# Patient Record
Sex: FEMALE | Race: WHITE | NOT HISPANIC OR LATINO
[De-identification: names, ages, dates, MRNs, and addresses within clinical notes are randomized per-mention and may not be internally consistent; named-entity substitution may affect disease eponyms.]

---

## 2017-03-06 ENCOUNTER — APPOINTMENT (OUTPATIENT)
Dept: GYNECOLOGIC ONCOLOGY | Facility: CLINIC | Age: 57
End: 2017-03-06

## 2017-03-06 VITALS
BODY MASS INDEX: 25.19 KG/M2 | WEIGHT: 174 LBS | DIASTOLIC BLOOD PRESSURE: 79 MMHG | HEART RATE: 83 BPM | HEIGHT: 69.5 IN | SYSTOLIC BLOOD PRESSURE: 137 MMHG

## 2017-09-11 ENCOUNTER — APPOINTMENT (OUTPATIENT)
Dept: GYNECOLOGIC ONCOLOGY | Facility: CLINIC | Age: 57
End: 2017-09-11
Payer: COMMERCIAL

## 2017-09-11 VITALS
HEART RATE: 85 BPM | SYSTOLIC BLOOD PRESSURE: 129 MMHG | BODY MASS INDEX: 25.05 KG/M2 | WEIGHT: 173 LBS | DIASTOLIC BLOOD PRESSURE: 81 MMHG | HEIGHT: 69.5 IN

## 2017-09-11 PROCEDURE — 99213 OFFICE O/P EST LOW 20 MIN: CPT

## 2017-10-04 ENCOUNTER — APPOINTMENT (OUTPATIENT)
Dept: SURGERY | Facility: CLINIC | Age: 57
End: 2017-10-04

## 2018-03-15 ENCOUNTER — APPOINTMENT (OUTPATIENT)
Dept: GYNECOLOGIC ONCOLOGY | Facility: CLINIC | Age: 58
End: 2018-03-15
Payer: MEDICAID

## 2018-03-15 VITALS
SYSTOLIC BLOOD PRESSURE: 142 MMHG | WEIGHT: 170 LBS | HEIGHT: 69.5 IN | HEART RATE: 87 BPM | BODY MASS INDEX: 24.61 KG/M2 | DIASTOLIC BLOOD PRESSURE: 82 MMHG

## 2018-03-15 PROCEDURE — 99214 OFFICE O/P EST MOD 30 MIN: CPT

## 2018-09-20 ENCOUNTER — APPOINTMENT (OUTPATIENT)
Dept: GYNECOLOGIC ONCOLOGY | Facility: CLINIC | Age: 58
End: 2018-09-20
Payer: MEDICAID

## 2018-09-20 VITALS
BODY MASS INDEX: 24.69 KG/M2 | DIASTOLIC BLOOD PRESSURE: 82 MMHG | HEIGHT: 70 IN | SYSTOLIC BLOOD PRESSURE: 125 MMHG | HEART RATE: 91 BPM | WEIGHT: 172.5 LBS

## 2018-09-20 DIAGNOSIS — R10.2 PELVIC AND PERINEAL PAIN: ICD-10-CM

## 2018-09-20 PROCEDURE — 99214 OFFICE O/P EST MOD 30 MIN: CPT

## 2018-11-19 ENCOUNTER — APPOINTMENT (OUTPATIENT)
Dept: UROLOGY | Facility: CLINIC | Age: 58
End: 2018-11-19

## 2019-01-18 ENCOUNTER — APPOINTMENT (OUTPATIENT)
Dept: UROGYNECOLOGY | Facility: CLINIC | Age: 59
End: 2019-01-18

## 2019-02-18 ENCOUNTER — TRANSCRIPTION ENCOUNTER (OUTPATIENT)
Age: 59
End: 2019-02-18

## 2019-03-11 ENCOUNTER — APPOINTMENT (OUTPATIENT)
Dept: UROLOGY | Facility: CLINIC | Age: 59
End: 2019-03-11

## 2019-03-29 ENCOUNTER — APPOINTMENT (OUTPATIENT)
Dept: UROGYNECOLOGY | Facility: CLINIC | Age: 59
End: 2019-03-29
Payer: MEDICAID

## 2019-03-29 VITALS
HEIGHT: 67.5 IN | OXYGEN SATURATION: 99 % | HEART RATE: 102 BPM | WEIGHT: 170 LBS | RESPIRATION RATE: 16 BRPM | SYSTOLIC BLOOD PRESSURE: 142 MMHG | BODY MASS INDEX: 26.37 KG/M2 | DIASTOLIC BLOOD PRESSURE: 85 MMHG | TEMPERATURE: 97.6 F

## 2019-03-29 DIAGNOSIS — N32.81 OVERACTIVE BLADDER: ICD-10-CM

## 2019-03-29 DIAGNOSIS — R39.15 URGENCY OF URINATION: ICD-10-CM

## 2019-03-29 DIAGNOSIS — R30.0 DYSURIA: ICD-10-CM

## 2019-03-29 DIAGNOSIS — R35.0 FREQUENCY OF MICTURITION: ICD-10-CM

## 2019-03-29 PROCEDURE — 51701 INSERT BLADDER CATHETER: CPT

## 2019-03-29 PROCEDURE — 99204 OFFICE O/P NEW MOD 45 MIN: CPT | Mod: 25

## 2019-03-29 NOTE — PROCEDURE
[Fluid Management] : fluid management [Bladder Training] : bladder training [FreeTextEntry1] : Due to urinary complaints, a straight catheterization was performed to assess for retention and infection. The urethral meatus was prepped with betadine. Catheter inserted. PVR 20cc obtained. Pt tolerated well.

## 2019-03-29 NOTE — PHYSICAL EXAM
[No Acute Distress] : in no acute distress [Well developed] : well developed [Good Hygeine] : demonstrates good hygeine [Oriented x3] : oriented to person, place, and time [Normal Mood/Affect] : mood and affect are normal [Warm and Dry] : was warm and dry to touch [Normal Gait] : gait was normal [Labia Majora] : were normal [Labia Minora] : were normal [Normal Appearance] : general appearance was normal [Atrophy] : atrophy [No Bleeding] : there was no active vaginal bleeding [2] : 2 [Uterine Adnexae] : were not tender and not enlarged [Exam Deferred] : was deferred [Anxiety] : patient is not anxious [Tenderness] : ~T no ~M abdominal tenderness observed [Distended] : not distended [H/Smegaly] : no hepatosplenomegaly [Inguinal LAD] : no adenopathy was noted in the inguinal lymph nodes [Normal] : was normal

## 2019-03-29 NOTE — HISTORY OF PRESENT ILLNESS
[Cystocele (Obstetric)] : none [Rectal Prolapse] : none [Feelings Of Urinary Urgency] : daily [Urinary Tract Infection] : daily [Constipation Obstructed Defecation] : none [Stool Visible Blood] : none [Incomplete Emptying Of Stool] : none [Pelvic Pain] : none [Unable To Restrain Bowel Movement] : none [x3+] : three or more  times a night [Urinary Frequency] : frequent [] : years ago [de-identified] : 10x, had 3 urine cultures in past year which were negative [de-identified] : 2-3x [FreeTextEntry1] : \par \par \par PMH: constipation, hiatal hernia, uterine cancer\par PSH: robotic total hysterectomy, BSO 6/2013 for 1A grade 3 endometrial cancer s/p cuff radiation\par Meds: Protonix\par \par Intake: 64 oz water, 12 oz seltzer, coffee 2-3 times a week, gingerale\par

## 2019-03-29 NOTE — DISCUSSION/SUMMARY
[FreeTextEntry1] : \par -Urine sent for micro UA and culture, if microhematuria will proceed with the workup. Patient with h/o endometria ca s/p brachytherapy\par -We discussed possible etiologies of her urinary symptoms including overactive bladder. I recommend she start behavioral modifications and bladder training. Written instructions on how to perform bladder training were provided.  She will try this for 4-6 weeks. We reviewed medications for overactive bladder.  If she has no significant improvement in her symptoms she will try adding an anticholinergic medication. Risks and side effects reviewed extensively. She will RTO in 10-12 weeks for follow up or sooner if issues arise. If no improvement in her symptoms, will proceed with further workup including cystoscopy and urodynamic testing. \par -We discussed her dysuria and likely etiologies including severe atrophy. We discussed vaginal estrogen and reivewed risks and benefits. WE reviewed the available literature on vaginal estrogen. She declines at this time and would ilke to try nonhormonal moisturizers. Will start Replens or Luvena 2-3 times a week. \par

## 2019-04-01 ENCOUNTER — RESULT REVIEW (OUTPATIENT)
Age: 59
End: 2019-04-01

## 2019-04-01 LAB
APPEARANCE: CLEAR
BACTERIA UR CULT: NORMAL
BACTERIA: NEGATIVE
BILIRUBIN URINE: NEGATIVE
BLOOD URINE: NEGATIVE
COLOR: NORMAL
GLUCOSE QUALITATIVE U: NEGATIVE
HYALINE CASTS: 1 /LPF
KETONES URINE: NEGATIVE
LEUKOCYTE ESTERASE URINE: NEGATIVE
MICROSCOPIC-UA: NORMAL
NITRITE URINE: NEGATIVE
PH URINE: 6
PROTEIN URINE: NEGATIVE
RED BLOOD CELLS URINE: 0 /HPF
SPECIFIC GRAVITY URINE: 1.01
SQUAMOUS EPITHELIAL CELLS: 0 /HPF
UROBILINOGEN URINE: NORMAL
WHITE BLOOD CELLS URINE: 0 /HPF

## 2019-06-12 ENCOUNTER — APPOINTMENT (OUTPATIENT)
Dept: UROGYNECOLOGY | Facility: CLINIC | Age: 59
End: 2019-06-12

## 2019-09-05 ENCOUNTER — APPOINTMENT (OUTPATIENT)
Dept: GYNECOLOGIC ONCOLOGY | Facility: CLINIC | Age: 59
End: 2019-09-05

## 2019-09-12 ENCOUNTER — APPOINTMENT (OUTPATIENT)
Dept: DERMATOLOGY | Facility: CLINIC | Age: 59
End: 2019-09-12
Payer: COMMERCIAL

## 2019-09-12 DIAGNOSIS — R21 RASH AND OTHER NONSPECIFIC SKIN ERUPTION: ICD-10-CM

## 2019-09-12 DIAGNOSIS — L85.3 XEROSIS CUTIS: ICD-10-CM

## 2019-09-12 PROCEDURE — 99203 OFFICE O/P NEW LOW 30 MIN: CPT

## 2019-10-03 ENCOUNTER — APPOINTMENT (OUTPATIENT)
Dept: GYNECOLOGIC ONCOLOGY | Facility: CLINIC | Age: 59
End: 2019-10-03
Payer: COMMERCIAL

## 2019-10-03 VITALS
DIASTOLIC BLOOD PRESSURE: 79 MMHG | WEIGHT: 169.13 LBS | HEART RATE: 81 BPM | SYSTOLIC BLOOD PRESSURE: 130 MMHG | BODY MASS INDEX: 26.24 KG/M2 | HEIGHT: 67.5 IN

## 2019-10-03 PROCEDURE — 99214 OFFICE O/P EST MOD 30 MIN: CPT

## 2019-10-21 ENCOUNTER — APPOINTMENT (OUTPATIENT)
Dept: GYNECOLOGIC ONCOLOGY | Facility: CLINIC | Age: 59
End: 2019-10-21

## 2020-01-28 ENCOUNTER — APPOINTMENT (OUTPATIENT)
Dept: SURGERY | Facility: CLINIC | Age: 60
End: 2020-01-28
Payer: COMMERCIAL

## 2020-01-28 VITALS
HEART RATE: 96 BPM | HEIGHT: 68 IN | SYSTOLIC BLOOD PRESSURE: 146 MMHG | RESPIRATION RATE: 17 BRPM | DIASTOLIC BLOOD PRESSURE: 79 MMHG | TEMPERATURE: 98.7 F | OXYGEN SATURATION: 99 % | BODY MASS INDEX: 25.76 KG/M2 | WEIGHT: 170 LBS

## 2020-01-28 DIAGNOSIS — K44.9 DIAPHRAGMATIC HERNIA W/OUT OBSTRUCTION OR GANGRENE: ICD-10-CM

## 2020-01-28 DIAGNOSIS — L72.0 EPIDERMAL CYST: ICD-10-CM

## 2020-01-28 DIAGNOSIS — K21.9 GASTRO-ESOPHAGEAL REFLUX DISEASE W/OUT ESOPHAGITIS: ICD-10-CM

## 2020-01-28 PROCEDURE — 10060 I&D ABSCESS SIMPLE/SINGLE: CPT

## 2020-01-28 PROCEDURE — 99214 OFFICE O/P EST MOD 30 MIN: CPT | Mod: 25

## 2020-01-28 RX ORDER — SOLIFENACIN SUCCINATE 5 MG/1
5 TABLET, FILM COATED ORAL
Qty: 30 | Refills: 3 | Status: DISCONTINUED | COMMUNITY
Start: 2019-03-29 | End: 2020-01-28

## 2020-01-28 NOTE — PHYSICAL EXAM
[Normal Breath Sounds] : Normal breath sounds [Normal Heart Sounds] : normal heart sounds [Normal Rate and Rhythm] : normal rate and rhythm [No Rash or Lesion] : No rash or lesion [Alert] : alert [Oriented to Place] : oriented to place [Oriented to Person] : oriented to person [Oriented to Time] : oriented to time [Calm] : calm [Abdomen Masses] : No abdominal masses [Abdomen Tenderness] : ~T No ~M abdominal tenderness [JVD] : no jugular venous distention  [de-identified] : soft, nondistended. +BS [de-identified] : Well nourished female, in no apparent distress [de-identified] : WNL [de-identified] : Full ROM [FreeTextEntry1] : 4 CM by 4 CM erythema with central induration fluctuance and tenderness over left buttock.

## 2020-01-28 NOTE — ASSESSMENT
[FreeTextEntry1] : I have seen and evaluated patient and I have corroborated all nursing input into this note.  Patient with infected inclusion cyst. Indications, risks, benefits, alternatives for incision and drainage reviewed including but not limited to bleeding, infection, recurrence, and prolonged healing. All questions answered. 1% lidocaine with half percent Marcaine plus epinephrine injected. Incision and drainage performed with excision of overlying skin. Culture sent. Base of wound and edges of wound cauterized with Monsel solution. Sitz baths, bacitracin, dry dressing. Augmentin for 5 days (did well with it previously). Ibuprofen and Tylenol. Followup one month.

## 2020-01-28 NOTE — REVIEW OF SYSTEMS
[Chills] : chills [Heartburn] : heartburn [As Noted in HPI] : as noted in HPI [Negative] : Heme/Lymph

## 2020-01-28 NOTE — HISTORY OF PRESENT ILLNESS
[FreeTextEntry1] : Joanna is a 60 y/o female here for evaluation of buttock pain. Hx of I&D right anterior buttock infected inclusion cyst (furuncle), C&S sent, Augmentin prescribed for 1 week post -op/cellulitis surrounding tissue. Last seen on 5/4/16, patient with a healed I&D site at the right buttock. Patient also with an asymptomatic inclusion cyst on the left side. \par \par Today, patient reports left-sided buttock pain since this past Saturday. Pain is progressively worsening. Pain is worsened by sitting. Denies any fevers or drainage. Has normal formed BM either daily or every other day. PMH of constipation. Last colonoscopy was 2013.

## 2020-01-31 LAB — BACTERIA WND CULT: ABNORMAL

## 2020-02-26 ENCOUNTER — APPOINTMENT (OUTPATIENT)
Dept: SURGERY | Facility: CLINIC | Age: 60
End: 2020-02-26
Payer: COMMERCIAL

## 2020-02-26 VITALS
TEMPERATURE: 98.3 F | SYSTOLIC BLOOD PRESSURE: 117 MMHG | RESPIRATION RATE: 16 BRPM | DIASTOLIC BLOOD PRESSURE: 82 MMHG | HEART RATE: 99 BPM | OXYGEN SATURATION: 99 %

## 2020-02-26 DIAGNOSIS — Z09 ENCOUNTER FOR FOLLOW-UP EXAMINATION AFTER COMPLETED TREATMENT FOR CONDITIONS OTHER THAN MALIGNANT NEOPLASM: ICD-10-CM

## 2020-02-26 DIAGNOSIS — L72.3 SEBACEOUS CYST: ICD-10-CM

## 2020-02-26 PROCEDURE — 99212 OFFICE O/P EST SF 10 MIN: CPT

## 2020-02-26 RX ORDER — AMOXICILLIN AND CLAVULANATE POTASSIUM 875; 125 MG/1; MG/1
875-125 TABLET, COATED ORAL
Qty: 10 | Refills: 0 | Status: DISCONTINUED | COMMUNITY
Start: 2020-01-28 | End: 2020-02-26

## 2020-02-26 NOTE — HISTORY OF PRESENT ILLNESS
[FreeTextEntry1] : Joanna is a 58 y/o female s/p I&D of a left buttock cyst on 1/28/20. Culture sent. Base of wound and edges of wound cauterized with Monsel solution. Augmentin given for 5 days - now finished. States feeling much better. Sees some spotting on underwear but lessening. No pain or fever - taking sitz baths.\par Moving bowels daily with no problem - eating fiber.

## 2020-05-03 ENCOUNTER — TRANSCRIPTION ENCOUNTER (OUTPATIENT)
Age: 60
End: 2020-05-03

## 2020-05-26 ENCOUNTER — TRANSCRIPTION ENCOUNTER (OUTPATIENT)
Age: 60
End: 2020-05-26

## 2020-06-02 ENCOUNTER — TRANSCRIPTION ENCOUNTER (OUTPATIENT)
Age: 60
End: 2020-06-02

## 2020-06-06 ENCOUNTER — TRANSCRIPTION ENCOUNTER (OUTPATIENT)
Age: 60
End: 2020-06-06

## 2020-06-10 ENCOUNTER — APPOINTMENT (OUTPATIENT)
Dept: SURGERY | Facility: CLINIC | Age: 60
End: 2020-06-10
Payer: COMMERCIAL

## 2020-06-10 DIAGNOSIS — L02.92 FURUNCLE, UNSPECIFIED: ICD-10-CM

## 2020-06-10 DIAGNOSIS — Z22.322 CARRIER OR SUSPECTED CARRIER OF METHICILLIN RESISTANT STAPHYLOCOCCUS AUREUS: ICD-10-CM

## 2020-06-10 PROCEDURE — 10060 I&D ABSCESS SIMPLE/SINGLE: CPT

## 2020-06-10 PROCEDURE — 99213 OFFICE O/P EST LOW 20 MIN: CPT | Mod: 25

## 2020-06-10 NOTE — ASSESSMENT
[FreeTextEntry1] : New furuncle noted.  This 1 is only 1 cm.  1% lidocaine with half percent Marcaine plus epinephrine injected.  Incision and drainage performed.  Monsel solution applied.  No antibiotics since no cellulitis.  Will do nasal swab for MRSA because of recurrent infections.  Patient to perform Hibiclens wash once a week for the next few weeks.

## 2020-06-11 ENCOUNTER — TRANSCRIPTION ENCOUNTER (OUTPATIENT)
Age: 60
End: 2020-06-11

## 2020-06-11 LAB
MRSA SPEC QL CULT: NOT DETECTED
STAPH AUREUS (SA): NOT DETECTED

## 2020-06-12 ENCOUNTER — TRANSCRIPTION ENCOUNTER (OUTPATIENT)
Age: 60
End: 2020-06-12

## 2020-06-22 ENCOUNTER — APPOINTMENT (OUTPATIENT)
Dept: UROGYNECOLOGY | Facility: CLINIC | Age: 60
End: 2020-06-22

## 2020-09-12 NOTE — HISTORY OF PRESENT ILLNESS
[phone] [FreeTextEntry1] : Joanna is a 59 y/o female here for follow up visit. Hx of I&D of right anterior buttock infected inclusion cyst (furuncle) on 12/16/15. C&S sent, Augmentin prescribed for 1 week post -op/cellulitis surrounding. Patient is s/p I&D of a left buttock cyst on 1/28/20. Culture sent. Base of wound and edges of wound cauterized with Monsel solution. Augmentin given for 5 days.\par \par Today, patient reports feeling a lump near prior left buttock I&D site. Has slight pain. Denies drainage or fevers at home.

## 2020-10-22 ENCOUNTER — APPOINTMENT (OUTPATIENT)
Dept: GYNECOLOGIC ONCOLOGY | Facility: CLINIC | Age: 60
End: 2020-10-22
Payer: COMMERCIAL

## 2020-10-26 ENCOUNTER — APPOINTMENT (OUTPATIENT)
Dept: GYNECOLOGIC ONCOLOGY | Facility: CLINIC | Age: 60
End: 2020-10-26
Payer: COMMERCIAL

## 2020-10-26 VITALS
BODY MASS INDEX: 26.24 KG/M2 | WEIGHT: 173.13 LBS | SYSTOLIC BLOOD PRESSURE: 132 MMHG | HEIGHT: 68 IN | HEART RATE: 88 BPM | DIASTOLIC BLOOD PRESSURE: 78 MMHG

## 2020-10-26 PROCEDURE — 99214 OFFICE O/P EST MOD 30 MIN: CPT

## 2020-10-26 PROCEDURE — 99072 ADDL SUPL MATRL&STAF TM PHE: CPT

## 2021-03-25 ENCOUNTER — TRANSCRIPTION ENCOUNTER (OUTPATIENT)
Age: 61
End: 2021-03-25

## 2021-03-29 ENCOUNTER — TRANSCRIPTION ENCOUNTER (OUTPATIENT)
Age: 61
End: 2021-03-29

## 2021-07-01 ENCOUNTER — TRANSCRIPTION ENCOUNTER (OUTPATIENT)
Age: 61
End: 2021-07-01

## 2021-10-16 ENCOUNTER — NON-APPOINTMENT (OUTPATIENT)
Age: 61
End: 2021-10-16

## 2021-10-29 ENCOUNTER — NON-APPOINTMENT (OUTPATIENT)
Age: 61
End: 2021-10-29

## 2021-10-29 ENCOUNTER — APPOINTMENT (OUTPATIENT)
Dept: GYNECOLOGIC ONCOLOGY | Facility: CLINIC | Age: 61
End: 2021-10-29
Payer: COMMERCIAL

## 2021-10-29 VITALS
HEIGHT: 68 IN | BODY MASS INDEX: 25.76 KG/M2 | WEIGHT: 170 LBS | SYSTOLIC BLOOD PRESSURE: 146 MMHG | HEART RATE: 94 BPM | DIASTOLIC BLOOD PRESSURE: 82 MMHG

## 2021-10-29 PROCEDURE — 99214 OFFICE O/P EST MOD 30 MIN: CPT

## 2021-10-29 RX ORDER — CIPROFLOXACIN HYDROCHLORIDE 500 MG/1
500 TABLET, FILM COATED ORAL DAILY
Qty: 5 | Refills: 0 | Status: DISCONTINUED | COMMUNITY
Start: 2021-10-15 | End: 2021-10-29

## 2021-12-03 ENCOUNTER — TRANSCRIPTION ENCOUNTER (OUTPATIENT)
Age: 61
End: 2021-12-03

## 2022-01-06 ENCOUNTER — TRANSCRIPTION ENCOUNTER (OUTPATIENT)
Age: 62
End: 2022-01-06

## 2022-01-07 ENCOUNTER — TRANSCRIPTION ENCOUNTER (OUTPATIENT)
Age: 62
End: 2022-01-07

## 2022-10-24 ENCOUNTER — APPOINTMENT (OUTPATIENT)
Dept: GYNECOLOGIC ONCOLOGY | Facility: CLINIC | Age: 62
End: 2022-10-24

## 2022-10-24 VITALS
DIASTOLIC BLOOD PRESSURE: 82 MMHG | BODY MASS INDEX: 25.76 KG/M2 | WEIGHT: 170 LBS | SYSTOLIC BLOOD PRESSURE: 145 MMHG | HEART RATE: 92 BPM | HEIGHT: 68 IN

## 2022-10-24 PROCEDURE — 99214 OFFICE O/P EST MOD 30 MIN: CPT

## 2022-10-24 RX ORDER — NITROFURANTOIN (MONOHYDRATE/MACROCRYSTALS) 25; 75 MG/1; MG/1
100 CAPSULE ORAL
Qty: 10 | Refills: 0 | Status: DISCONTINUED | COMMUNITY
Start: 2022-05-05

## 2022-10-24 RX ORDER — SULFAMETHOXAZOLE AND TRIMETHOPRIM 800; 160 MG/1; MG/1
800-160 TABLET ORAL TWICE DAILY
Qty: 6 | Refills: 0 | Status: DISCONTINUED | COMMUNITY
Start: 2021-10-29 | End: 2022-10-24

## 2022-10-24 NOTE — DISCUSSION/SUMMARY
[FreeTextEntry1] : Signs and symptoms of recurrence reviewed in detail, namely vaginal bleeding, abdominal pain, changes in bowel habits or urination, nausea/vomiting.\par F/u in 12 months or sooner prn\par Plan for imaging as clinically indicated\par

## 2022-10-24 NOTE — HISTORY OF PRESENT ILLNESS
[FreeTextEntry1] : IA gr3 endometrial ca s/p robotic TLH/BSO/staging 6/25/2013 and adjuvant vaginal brachytherapy (complete 8/2013) \par \par last seen 10/2021\par \par She reports burning in vagina that is intermittent. \par Denies abdominal/pelvic pain, dyspnea or chest pain, vaginal bleeding or discharge, nausea/vomiting, changes in bowel habits or urination\par \par

## 2022-10-24 NOTE — PHYSICAL EXAM
[Chaperone Present] : A chaperone was present in the examining room during all aspects of the physical examination [Absent] : Adnexa(ae): Absent [Normal] : Recto-Vaginal Exam: Normal [de-identified] : proximal vaginal stenosis

## 2023-02-04 ENCOUNTER — EMERGENCY (EMERGENCY)
Facility: HOSPITAL | Age: 63
LOS: 1 days | Discharge: HOME | End: 2023-02-04
Attending: STUDENT IN AN ORGANIZED HEALTH CARE EDUCATION/TRAINING PROGRAM | Admitting: EMERGENCY MEDICINE
Payer: COMMERCIAL

## 2023-02-04 VITALS
RESPIRATION RATE: 16 BRPM | DIASTOLIC BLOOD PRESSURE: 57 MMHG | TEMPERATURE: 99 F | SYSTOLIC BLOOD PRESSURE: 164 MMHG | OXYGEN SATURATION: 99 % | HEART RATE: 114 BPM

## 2023-02-04 VITALS
SYSTOLIC BLOOD PRESSURE: 125 MMHG | TEMPERATURE: 98 F | RESPIRATION RATE: 16 BRPM | OXYGEN SATURATION: 100 % | DIASTOLIC BLOOD PRESSURE: 81 MMHG | HEART RATE: 88 BPM

## 2023-02-04 LAB
ALBUMIN SERPL ELPH-MCNC: 4.5 G/DL — SIGNIFICANT CHANGE UP (ref 3.3–5)
ALP SERPL-CCNC: 147 U/L — HIGH (ref 40–120)
ALT FLD-CCNC: 15 U/L — SIGNIFICANT CHANGE UP (ref 4–33)
ANION GAP SERPL CALC-SCNC: 15 MMOL/L — HIGH (ref 7–14)
APPEARANCE UR: ABNORMAL
AST SERPL-CCNC: 16 U/L — SIGNIFICANT CHANGE UP (ref 4–32)
BASOPHILS # BLD AUTO: 0.05 K/UL — SIGNIFICANT CHANGE UP (ref 0–0.2)
BASOPHILS NFR BLD AUTO: 0.5 % — SIGNIFICANT CHANGE UP (ref 0–2)
BILIRUB SERPL-MCNC: 0.4 MG/DL — SIGNIFICANT CHANGE UP (ref 0.2–1.2)
BILIRUB UR-MCNC: NEGATIVE — SIGNIFICANT CHANGE UP
BLOOD GAS VENOUS COMPREHENSIVE RESULT: SIGNIFICANT CHANGE UP
BLOOD GAS VENOUS COMPREHENSIVE RESULT: SIGNIFICANT CHANGE UP
BUN SERPL-MCNC: 14 MG/DL — SIGNIFICANT CHANGE UP (ref 7–23)
CALCIUM SERPL-MCNC: 9.8 MG/DL — SIGNIFICANT CHANGE UP (ref 8.4–10.5)
CHLORIDE SERPL-SCNC: 104 MMOL/L — SIGNIFICANT CHANGE UP (ref 98–107)
CO2 SERPL-SCNC: 24 MMOL/L — SIGNIFICANT CHANGE UP (ref 22–31)
COLOR SPEC: SIGNIFICANT CHANGE UP
CREAT SERPL-MCNC: 0.98 MG/DL — SIGNIFICANT CHANGE UP (ref 0.5–1.3)
DIFF PNL FLD: ABNORMAL
EGFR: 65 ML/MIN/1.73M2 — SIGNIFICANT CHANGE UP
EOSINOPHIL # BLD AUTO: 0.02 K/UL — SIGNIFICANT CHANGE UP (ref 0–0.5)
EOSINOPHIL NFR BLD AUTO: 0.2 % — SIGNIFICANT CHANGE UP (ref 0–6)
FLUAV AG NPH QL: SIGNIFICANT CHANGE UP
FLUBV AG NPH QL: SIGNIFICANT CHANGE UP
GLUCOSE SERPL-MCNC: 120 MG/DL — HIGH (ref 70–99)
GLUCOSE UR QL: NEGATIVE — SIGNIFICANT CHANGE UP
HCT VFR BLD CALC: 41.5 % — SIGNIFICANT CHANGE UP (ref 34.5–45)
HGB BLD-MCNC: 13.5 G/DL — SIGNIFICANT CHANGE UP (ref 11.5–15.5)
IANC: 8.54 K/UL — HIGH (ref 1.8–7.4)
IMM GRANULOCYTES NFR BLD AUTO: 0.5 % — SIGNIFICANT CHANGE UP (ref 0–0.9)
KETONES UR-MCNC: NEGATIVE — SIGNIFICANT CHANGE UP
LEUKOCYTE ESTERASE UR-ACNC: ABNORMAL
LYMPHOCYTES # BLD AUTO: 1.2 K/UL — SIGNIFICANT CHANGE UP (ref 1–3.3)
LYMPHOCYTES # BLD AUTO: 11.6 % — LOW (ref 13–44)
MAGNESIUM SERPL-MCNC: 2 MG/DL — SIGNIFICANT CHANGE UP (ref 1.6–2.6)
MCHC RBC-ENTMCNC: 28.8 PG — SIGNIFICANT CHANGE UP (ref 27–34)
MCHC RBC-ENTMCNC: 32.5 GM/DL — SIGNIFICANT CHANGE UP (ref 32–36)
MCV RBC AUTO: 88.7 FL — SIGNIFICANT CHANGE UP (ref 80–100)
MONOCYTES # BLD AUTO: 0.46 K/UL — SIGNIFICANT CHANGE UP (ref 0–0.9)
MONOCYTES NFR BLD AUTO: 4.5 % — SIGNIFICANT CHANGE UP (ref 2–14)
NEUTROPHILS # BLD AUTO: 8.54 K/UL — HIGH (ref 1.8–7.4)
NEUTROPHILS NFR BLD AUTO: 82.7 % — HIGH (ref 43–77)
NITRITE UR-MCNC: NEGATIVE — SIGNIFICANT CHANGE UP
NRBC # BLD: 0 /100 WBCS — SIGNIFICANT CHANGE UP (ref 0–0)
NRBC # FLD: 0 K/UL — SIGNIFICANT CHANGE UP (ref 0–0)
PH UR: 6 — SIGNIFICANT CHANGE UP (ref 5–8)
PLATELET # BLD AUTO: 269 K/UL — SIGNIFICANT CHANGE UP (ref 150–400)
POTASSIUM SERPL-MCNC: 3.9 MMOL/L — SIGNIFICANT CHANGE UP (ref 3.5–5.3)
POTASSIUM SERPL-SCNC: 3.9 MMOL/L — SIGNIFICANT CHANGE UP (ref 3.5–5.3)
PROT SERPL-MCNC: 7.4 G/DL — SIGNIFICANT CHANGE UP (ref 6–8.3)
PROT UR-MCNC: ABNORMAL
RBC # BLD: 4.68 M/UL — SIGNIFICANT CHANGE UP (ref 3.8–5.2)
RBC # FLD: 13 % — SIGNIFICANT CHANGE UP (ref 10.3–14.5)
RSV RNA NPH QL NAA+NON-PROBE: SIGNIFICANT CHANGE UP
SARS-COV-2 RNA SPEC QL NAA+PROBE: SIGNIFICANT CHANGE UP
SODIUM SERPL-SCNC: 143 MMOL/L — SIGNIFICANT CHANGE UP (ref 135–145)
SP GR SPEC: 1.01 — SIGNIFICANT CHANGE UP (ref 1.01–1.05)
UROBILINOGEN FLD QL: SIGNIFICANT CHANGE UP
WBC # BLD: 10.32 K/UL — SIGNIFICANT CHANGE UP (ref 3.8–10.5)
WBC # FLD AUTO: 10.32 K/UL — SIGNIFICANT CHANGE UP (ref 3.8–10.5)

## 2023-02-04 PROCEDURE — 74177 CT ABD & PELVIS W/CONTRAST: CPT | Mod: 26,MA

## 2023-02-04 PROCEDURE — 99223 1ST HOSP IP/OBS HIGH 75: CPT

## 2023-02-04 RX ORDER — SODIUM CHLORIDE 9 MG/ML
1000 INJECTION INTRAMUSCULAR; INTRAVENOUS; SUBCUTANEOUS ONCE
Refills: 0 | Status: COMPLETED | OUTPATIENT
Start: 2023-02-04 | End: 2023-02-04

## 2023-02-04 RX ORDER — ONDANSETRON 8 MG/1
4 TABLET, FILM COATED ORAL ONCE
Refills: 0 | Status: COMPLETED | OUTPATIENT
Start: 2023-02-04 | End: 2023-02-04

## 2023-02-04 RX ORDER — KETOROLAC TROMETHAMINE 30 MG/ML
30 SYRINGE (ML) INJECTION EVERY 6 HOURS
Refills: 0 | Status: DISCONTINUED | OUTPATIENT
Start: 2023-02-04 | End: 2023-02-04

## 2023-02-04 RX ORDER — PHENAZOPYRIDINE HCL 100 MG
1 TABLET ORAL
Qty: 6 | Refills: 0
Start: 2023-02-04 | End: 2023-02-06

## 2023-02-04 RX ORDER — CIPROFLOXACIN LACTATE 400MG/40ML
1 VIAL (ML) INTRAVENOUS
Qty: 20 | Refills: 0
Start: 2023-02-04 | End: 2023-02-13

## 2023-02-04 RX ORDER — CIPROFLOXACIN LACTATE 400MG/40ML
400 VIAL (ML) INTRAVENOUS ONCE
Refills: 0 | Status: DISCONTINUED | OUTPATIENT
Start: 2023-02-04 | End: 2023-02-04

## 2023-02-04 RX ORDER — SODIUM CHLORIDE 9 MG/ML
1000 INJECTION INTRAMUSCULAR; INTRAVENOUS; SUBCUTANEOUS
Refills: 0 | Status: DISCONTINUED | OUTPATIENT
Start: 2023-02-04 | End: 2023-02-07

## 2023-02-04 RX ORDER — KETOROLAC TROMETHAMINE 30 MG/ML
15 SYRINGE (ML) INJECTION ONCE
Refills: 0 | Status: DISCONTINUED | OUTPATIENT
Start: 2023-02-04 | End: 2023-02-04

## 2023-02-04 RX ORDER — CIPROFLOXACIN LACTATE 400MG/40ML
500 VIAL (ML) INTRAVENOUS EVERY 12 HOURS
Refills: 0 | Status: DISCONTINUED | OUTPATIENT
Start: 2023-02-04 | End: 2023-02-07

## 2023-02-04 RX ADMIN — SODIUM CHLORIDE 1000 MILLILITER(S): 9 INJECTION INTRAMUSCULAR; INTRAVENOUS; SUBCUTANEOUS at 12:20

## 2023-02-04 RX ADMIN — SODIUM CHLORIDE 1000 MILLILITER(S): 9 INJECTION INTRAMUSCULAR; INTRAVENOUS; SUBCUTANEOUS at 07:03

## 2023-02-04 RX ADMIN — SODIUM CHLORIDE 150 MILLILITER(S): 9 INJECTION INTRAMUSCULAR; INTRAVENOUS; SUBCUTANEOUS at 14:00

## 2023-02-04 RX ADMIN — SODIUM CHLORIDE 1000 MILLILITER(S): 9 INJECTION INTRAMUSCULAR; INTRAVENOUS; SUBCUTANEOUS at 08:27

## 2023-02-04 RX ADMIN — ONDANSETRON 4 MILLIGRAM(S): 8 TABLET, FILM COATED ORAL at 07:03

## 2023-02-04 RX ADMIN — Medication 15 MILLIGRAM(S): at 07:07

## 2023-02-04 RX ADMIN — Medication 15 MILLIGRAM(S): at 12:05

## 2023-02-04 RX ADMIN — Medication 500 MILLIGRAM(S): at 10:15

## 2023-02-04 NOTE — ED CDU PROVIDER DISPOSITION NOTE - CLINICAL COURSE
61yo F with PMH of uterine CA s/p BRY, hiatal hernia, GERD here with L flank pain. Started suddenly 24hr ago, deep constant aching pain that radiates forward into lower abd from L flank. Assoc with urinary freq, no hematuria/cloudy urine. Pain did not improve and now starting to feel nausea so came to ED. No fever but had chills, no CP, SOB, vomiting/diarrhea. Never had a kidney stone, does have hx of UTI but no dysuria. This is a 66-year-old female history of uterine cancer currently not going any treatment hysterectomy presented to the emergency room complaining of left flank pain developed last 24 hours 40 hours urinary urgency frequency.  She received some pain meds in the emergency room now is feeling better she was also having some nausea, she has had UTIs in the past nothing this severe.  In the ED she was found to have pyelonephritis.  She is receiving p.o. antibiotics and IV fluids.  She had an elevated lactate which is now improved. She denies having any pain at this time states that she is feeling better. tolerating PO, and vital improved. patient would like to be discharged home. Patient is well appearing, return precautions given.

## 2023-02-04 NOTE — ED ADULT NURSE NOTE - OBJECTIVE STATEMENT
Pt A&Ox4 ambulatory, PMH Mitral prolapse, hysterectomy s/p Uterine CA, presenting to the ED (RM 28) c/o left flank side. Pt states developed left sided flank pain x 1 day. Pt also endorses nausea, no vomiting episode, slight fever and chills. Pt states took oral temp at home and was 99.8F. Denies taking any medication. Pt also endorses urinary frequency and denies any other urinary symptoms. Denies any other symptoms, Respirations are even and unlabored, NAD, VS noted, 20G IV LAC, labs sent, RVP sent, medicated as per orders, Safety precautions implemented as per protocol, awaiting further MD orders, will continue with plan of care.

## 2023-02-04 NOTE — ED ADULT NURSE REASSESSMENT NOTE - TEMPERATURE IN FAHRENHEIT (DEGREES F)
98.4
98.8
Abdominal distention and masses absent/Scaphoid abdomen absent/Umbilicus with 3 vessels, normal color size and texture/Nontender/Adequate bowel sound pattern for age/Normal contour/Spleen tip absend or slightly below rib margin/Liver palpable < 2 cm below rib margin with sharp edge/Kidney size and shape is acceptable/Abdominal wall defects absent

## 2023-02-04 NOTE — ED ADULT NURSE REASSESSMENT NOTE - NS ED NURSE REASSESS COMMENT FT1
Pt states "I have occasional PVCs". Pt states was diagnosed by PCP about 1 year ago. Pt states had an ECHO test, Holter monitoring and EKG performed, which all came back negative. Pt A&Ox4, no complaints at this moment, NAD, denies cp, sob, palpitations, dizziness, lightheadedness, HA, blurry vision. Respirations are even and unlabored, pt on CM=Sinus tachy. Terell Chawla made aware, EKG in progress, Safety precautions implemented as per protocol, awaiting further MD orders, will continue with plan of care.
Report received from night shift RN. pt aoox4, ambulatory, breathing even and unlabored in bed. Pt denies SOB, chest pain, dizziness, headache, blurry vision and pain. Pt  at bedside, bed in lowest position, call bell within reach.
pt aaox4, ambulatory, breathing even and unlabored. Pt denies SOB, chest pain, headache, dizziness and pain. Pt states her only pain is a burning sensation when urinating. Bed in lowest position, call bell within reach,  at bedside. Report given to CDU.

## 2023-02-04 NOTE — ED PROVIDER NOTE - PHYSICAL EXAMINATION
CONSTITUTIONAL: well appearing, mildly discomforted   SKIN: Warm dry  ENT: dry oral mucosa  CARD: RRR  RESP: CTAB  ABD: S/NT no R/G; +L CVA tenderness, no rebound/guarding, no suprapubic tenderness   EXT: no pedal edema  NEURO: Grossly non-focal  PSYCH: Cooperative, appropriate.

## 2023-02-04 NOTE — ED PROVIDER NOTE - CLINICAL SUMMARY MEDICAL DECISION MAKING FREE TEXT BOX
Raymundo PGY2: 63yo F with PMH of uterine CA s/p BRY, hiatal hernia, GERD here with L flank pain assoc with frequency and nausea and CVA tenderness on exam. Differential Diagnosis includes but not limited to nephrolithiasis vs septic stone vs pyelo vs complicated UTI vs less likely diverticulitis or sbo. labs, toradol, IVF, ua UCx, and CT a/p. Would do with IV contrast given hx of uti to r/o pyelo or abscess or other complex issue in abd.

## 2023-02-04 NOTE — ED CDU PROVIDER DISPOSITION NOTE - PATIENT PORTAL LINK FT
You can access the FollowMyHealth Patient Portal offered by [facility] by registering at the following website: [email or web address]. By joining [facility]’s FollowMyHealth portal, you will also be able to view your health information using other applications (apps) compatible with our system.

## 2023-02-04 NOTE — ED PROVIDER NOTE - PROGRESS NOTE DETAILS
PA Ali: patient amenable to staying in CDU for iv fluids, and pain control. Pt will also receive IV antibiotics. Pt currently feeling better.

## 2023-02-04 NOTE — ED PROVIDER NOTE - OBJECTIVE STATEMENT
63yo F with PMH of uterine CA s/p BRY, hiatal hernia, GERD here with L flank pain. Started suddenly 24hr ago, deep constant aching pain that radiates foward into lower abd from L flank. Assoc with urinary freq, no hematuria/cloudy urine. Pain did not improve and now starting to feel nausea so came to ED. No fever but had chills, no CP, SOB, vomiting/diarrhea. Never had a kidney stone, does have hx of UTI but no dysuria.

## 2023-02-04 NOTE — ED CDU PROVIDER DISPOSITION NOTE - ATTENDING CONTRIBUTION TO CARE
I performed a face to face evaluation of this patient and obtained a history and performed a full exam.  I agree with the history, physical exam and plan of the PA.  63yo F with PMH of uterine CA s/p BRY, hiatal hernia, GERD here with L flank pain. Started suddenly 24hr ago, deep constant aching pain that radiates forward into lower abd from L flank. Assoc with urinary freq, no hematuria/cloudy urine. Pain did not improve and now starting to feel nausea so came to ED. No fever but had chills, no CP, SOB, vomiting/diarrhea. Never had a kidney stone, does have hx of UTI but no dysuria. This is a 66-year-old female history of uterine cancer currently not going any treatment hysterectomy presented to the emergency room complaining of left flank pain developed last 24 hours 40 hours urinary urgency frequency.  She received some pain meds in the emergency room now is feeling better she was also having some nausea, she has had UTIs in the past nothing this severe.  In the ED she was found to have pyelonephritis.  She is receiving p.o. antibiotics and IV fluids.  She had an elevated lactate which is now improved. She denies having any pain at this time states that she is feeling better. tolerating PO, and vital improved. patient would like to be discharged home. Patient is well appearing, return precautions given.  Pt improved, abd soft nontender, and vital signs stable

## 2023-02-04 NOTE — ED ADULT TRIAGE NOTE - CHIEF COMPLAINT QUOTE
Pt st" Last night up all night every 1/2 hour urinating and I have pain in left lower back...urine is clear did not see blood and no burning."

## 2023-02-04 NOTE — ED CDU PROVIDER INITIAL DAY NOTE - ATTENDING APP SHARED VISIT CONTRIBUTION OF CARE
I performed a face to face evaluation of this patient and obtained a history and performed a full exam.  I agree with the history, physical exam and plan of the PA.  I performed a face to face evaluation of this patient and performed a full history and physical examination on the patient.  I agree with the resident's history, physical examination, and plan of the patient.  63yo F with PMH of uterine CA s/p BRY, hiatal hernia, GERD here with L flank pain assoc with frequency and nausea and CVA tenderness on exam. Pt found to have clinical pyelonephritis, she received 1 dose of cipro in ED, will continue Q12H, patient tolerating PO, will continue with IV fluids and pain control PRN. Will continue to monitor.  NO cvat on exam, belly soft nontender, well appearing with mild pain.     Pt with increased heart rate earlier, and temperature slightly above normal, needs monitoring, observe for worse symptoms, sepsis, pt improved from the ED.  antibiotics and vital sign monitoring.

## 2023-02-04 NOTE — ED CDU PROVIDER INITIAL DAY NOTE - NS ED ATTENDING STATEMENT MOD
This was a shared visit with the AIXA. I reviewed and verified the documentation and independently performed the documented:

## 2023-02-04 NOTE — ED CDU PROVIDER DISPOSITION NOTE - NSFOLLOWUPINSTRUCTIONS_ED_ALL_ED_FT
Follow up with your PMD within 48-72 hours.      Take Cipro 500 mg 1 tab 2x/day for 10 days.      Increase fluids.     Motrin 600mg every 8 hours with food for pain.     Worsening pain, new fever, chills, nausea, vomiting return to ER

## 2023-02-04 NOTE — ED CDU PROVIDER INITIAL DAY NOTE - CLINICAL SUMMARY MEDICAL DECISION MAKING FREE TEXT BOX
Raymundo PGY2: 61yo F with PMH of uterine CA s/p BRY, hiatal hernia, GERD here with L flank pain assoc with frequency and nausea and CVA tenderness on exam. Pt found to have clinical pyelonephritis, she received 1 dose of cipro in ED, will continue Q12H, patient tolerating PO, will continue with IV fluids and pain control PRN. Will continue to monitor. 63yo F with PMH of uterine CA s/p BRY, hiatal hernia, GERD here with L flank pain assoc with frequency and nausea and CVA tenderness on exam. Pt found to have clinical pyelonephritis, she received 1 dose of cipro in ED, will continue Q12H, patient tolerating PO, will continue with IV fluids and pain control PRN. Will continue to monitor.

## 2023-02-04 NOTE — ED PROVIDER NOTE - NSICDXPASTMEDICALHX_GEN_ALL_CORE_FT
PAST MEDICAL HISTORY:  Hiatal hernia takes Prevacid PRN    Malignant neoplasm of corpus uteri     Mitral valve prolapse     Vitamin D deficiency

## 2023-02-04 NOTE — ED CDU PROVIDER INITIAL DAY NOTE - OBJECTIVE STATEMENT
61yo F with PMH of uterine CA s/p BRY, hiatal hernia, GERD here with L flank pain. Started suddenly 24hr ago, deep constant aching pain that radiates foward into lower abd from L flank. Assoc with urinary freq, no hematuria/cloudy urine. Pain did not improve and now starting to feel nausea so came to ED. No fever but had chills, no CP, SOB, vomiting/diarrhea. Never had a kidney stone, does have hx of UTI but no dysuria. This is a 66-year-old female history of uterine cancer currently not going any treatment hysterectomy presented to the emergency room complaining of left flank pain developed last 24 hours 40 hours urinary urgency frequency.  She received some pain meds in the emergency room now is feeling better she was also having some nausea, she has had UTIs in the past nothing this severe.  In the ED she was found to have pyelonephritis.  She is receiving p.o. antibiotics and IV fluids.  She had an elevated lactate which is being repeated.  She denies having any pain at this time states that she is feeling better.

## 2023-02-04 NOTE — ED PROVIDER NOTE - ATTENDING CONTRIBUTION TO CARE
Dr. Thurman, Attending Physician-  I performed a face to face bedside interview with patient regarding history of present illness, review of symptoms and past medical history. I completed an independent physical exam.  I have discussed patient's plan of care with the resident.    62F, hx of uterina cancer s/p BRY, GERD who presents with L flank pain. For the past 24 hours, reports L sided flank pain with radiation to LLQ. Denies dysuria, hematuria. No vaginal bleeding/discharge. Denies hx of kidney stones. Associated with nausea and urinary frequency. No headaches, fevers, chills, cough, sputum, cp, sob, vomiting, diarrhea, black/bloody stools. Physical: afebrile, well appearing, rrr, ctabl, abdomen soft, no le edema. Plan: labs, urine, CTAP, IVF, pain control - to evaluate for UTI vs. nephrolithiasis.

## 2023-02-06 ENCOUNTER — APPOINTMENT (OUTPATIENT)
Dept: UROLOGY | Facility: CLINIC | Age: 63
End: 2023-02-06
Payer: COMMERCIAL

## 2023-02-06 VITALS
WEIGHT: 170 LBS | SYSTOLIC BLOOD PRESSURE: 133 MMHG | BODY MASS INDEX: 25.76 KG/M2 | TEMPERATURE: 97.4 F | HEIGHT: 68 IN | HEART RATE: 97 BPM | OXYGEN SATURATION: 98 % | RESPIRATION RATE: 16 BRPM | DIASTOLIC BLOOD PRESSURE: 85 MMHG

## 2023-02-06 PROCEDURE — 99204 OFFICE O/P NEW MOD 45 MIN: CPT

## 2023-02-06 NOTE — PHYSICAL EXAM
[General Appearance - Well Developed] : well developed [General Appearance - Well Nourished] : well nourished [General Appearance - In No Acute Distress] : no acute distress [Edema] : no peripheral edema [Exaggerated Use Of Accessory Muscles For Inspiration] : no accessory muscle use [Abdomen Soft] : soft [Costovertebral Angle Tenderness] : no ~M costovertebral angle tenderness [Abdomen Tenderness] : non-tender [FreeTextEntry1] : PVR=0 [Normal Station and Gait] : the gait and station were normal for the patient's age [No Focal Deficits] : no focal deficits [Oriented To Time, Place, And Person] : oriented to person, place, and time

## 2023-02-06 NOTE — HISTORY OF PRESENT ILLNESS
[FreeTextEntry1] : 61yo female with "complicated UTI". Pt with hx of uterine ca s/p BRY with adjuvant radiation 10y ago. She has had issues with UTIs since then getting 2+ per year. She developed acute change in urinary sx last Friday with dysuria. She called PCP and was asked to give urine. She had increased frequency and urgency. No fever or chills. Also with L flank pain. She went to ER following day. WBC normal. UA dirty. Had CT scan that showed L ureteritis. Has B large cysts. Also ? of very small R renal stone. Tx cipro x10d. Still taking. UCx with >100K EColi\par \par At baseline, no urinary complaints. Does not drink enough per report--drinks 2-3 bottles per day. No caffeine. Has issues with constipation. No prior hx of stones. Dad with hx of stones--multiple with surgery in the past. Last abx was May 2022 for UTI. \par \par

## 2023-02-09 ENCOUNTER — TRANSCRIPTION ENCOUNTER (OUTPATIENT)
Age: 63
End: 2023-02-09

## 2023-02-10 ENCOUNTER — TRANSCRIPTION ENCOUNTER (OUTPATIENT)
Age: 63
End: 2023-02-10

## 2023-02-16 ENCOUNTER — TRANSCRIPTION ENCOUNTER (OUTPATIENT)
Age: 63
End: 2023-02-16

## 2023-02-16 ENCOUNTER — NON-APPOINTMENT (OUTPATIENT)
Age: 63
End: 2023-02-16

## 2023-02-22 ENCOUNTER — NON-APPOINTMENT (OUTPATIENT)
Age: 63
End: 2023-02-22

## 2023-02-24 LAB
APPEARANCE: CLEAR
BACTERIA: NEGATIVE
BILIRUBIN URINE: NEGATIVE
BLOOD URINE: NEGATIVE
COLOR: YELLOW
GLUCOSE QUALITATIVE U: NEGATIVE
HYALINE CASTS: 0 /LPF
KETONES URINE: NEGATIVE
LEUKOCYTE ESTERASE URINE: NEGATIVE
MICROSCOPIC-UA: NORMAL
NITRITE URINE: NEGATIVE
PH URINE: 5.5
PROTEIN URINE: NORMAL
RED BLOOD CELLS URINE: 1 /HPF
SPECIFIC GRAVITY URINE: 1.02
SQUAMOUS EPITHELIAL CELLS: 1 /HPF
UROBILINOGEN URINE: NORMAL
WHITE BLOOD CELLS URINE: 1 /HPF

## 2023-02-27 LAB — BACTERIA UR CULT: NORMAL

## 2023-03-06 ENCOUNTER — TRANSCRIPTION ENCOUNTER (OUTPATIENT)
Age: 63
End: 2023-03-06

## 2023-03-14 ENCOUNTER — NON-APPOINTMENT (OUTPATIENT)
Age: 63
End: 2023-03-14

## 2023-03-14 LAB
APPEARANCE: CLEAR
BACTERIA UR CULT: NORMAL
BACTERIA: NEGATIVE
BILIRUBIN URINE: NEGATIVE
BLOOD URINE: NEGATIVE
COLOR: NORMAL
GLUCOSE QUALITATIVE U: NEGATIVE
HYALINE CASTS: 0 /LPF
KETONES URINE: NEGATIVE
LEUKOCYTE ESTERASE URINE: NEGATIVE
MICROSCOPIC-UA: NORMAL
NITRITE URINE: NEGATIVE
PH URINE: 5.5
PROTEIN URINE: NORMAL
RED BLOOD CELLS URINE: 2 /HPF
SPECIFIC GRAVITY URINE: 1.02
SQUAMOUS EPITHELIAL CELLS: 0 /HPF
UROBILINOGEN URINE: NORMAL
WHITE BLOOD CELLS URINE: 0 /HPF

## 2023-03-22 ENCOUNTER — APPOINTMENT (OUTPATIENT)
Dept: DERMATOLOGY | Facility: CLINIC | Age: 63
End: 2023-03-22
Payer: COMMERCIAL

## 2023-03-22 DIAGNOSIS — L81.4 OTHER MELANIN HYPERPIGMENTATION: ICD-10-CM

## 2023-03-22 DIAGNOSIS — L82.0 INFLAMED SEBORRHEIC KERATOSIS: ICD-10-CM

## 2023-03-22 DIAGNOSIS — L29.9 PRURITUS, UNSPECIFIED: ICD-10-CM

## 2023-03-22 DIAGNOSIS — D22.9 MELANOCYTIC NEVI, UNSPECIFIED: ICD-10-CM

## 2023-03-22 PROCEDURE — 17110 DESTRUCTION B9 LES UP TO 14: CPT

## 2023-03-22 PROCEDURE — 99204 OFFICE O/P NEW MOD 45 MIN: CPT | Mod: 25

## 2023-03-22 NOTE — ASSESSMENT
[FreeTextEntry1] : 1) benign findings as above- education\par \par 2) ISK\par The specified lesions were treated with liquid nitrogen cryotherapy.  Discussed risks including pain, blistering, crusting, discoloration, recurrence.\par \par 3) xerosis/pruritus\par -education\par -gentle skin care reviewed\par

## 2023-03-22 NOTE — HISTORY OF PRESENT ILLNESS
[FreeTextEntry1] : spots on back [de-identified] : 63 year old female here with spots on back. itchy. growing.

## 2023-07-27 ENCOUNTER — NON-APPOINTMENT (OUTPATIENT)
Age: 63
End: 2023-07-27

## 2023-07-27 RX ORDER — CIPROFLOXACIN HYDROCHLORIDE 500 MG/1
500 TABLET, FILM COATED ORAL
Qty: 14 | Refills: 0 | Status: ACTIVE | COMMUNITY
Start: 2023-03-06 | End: 1900-01-01

## 2023-08-07 LAB
APPEARANCE: CLEAR
BACTERIA UR CULT: NORMAL
BACTERIA: NEGATIVE /HPF
BILIRUBIN URINE: NEGATIVE
BLOOD URINE: NEGATIVE
CAST: 1 /LPF
COLOR: YELLOW
EPITHELIAL CELLS: 0 /HPF
GLUCOSE QUALITATIVE U: NEGATIVE MG/DL
KETONES URINE: NEGATIVE MG/DL
LEUKOCYTE ESTERASE URINE: NEGATIVE
MICROSCOPIC-UA: NORMAL
NITRITE URINE: NEGATIVE
PH URINE: 6.5
PROTEIN URINE: NEGATIVE MG/DL
RED BLOOD CELLS URINE: 0 /HPF
SPECIFIC GRAVITY URINE: 1.01
UROBILINOGEN URINE: 0.2 MG/DL
WHITE BLOOD CELLS URINE: 0 /HPF

## 2023-10-06 ENCOUNTER — APPOINTMENT (OUTPATIENT)
Dept: GYNECOLOGIC ONCOLOGY | Facility: CLINIC | Age: 63
End: 2023-10-06
Payer: COMMERCIAL

## 2023-10-09 ENCOUNTER — NON-APPOINTMENT (OUTPATIENT)
Age: 63
End: 2023-10-09

## 2023-10-19 NOTE — ASSESSMENT
[FreeTextEntry1] : --F/u UCx sensitivities\par --Complete cipro\par --Increase fluid intake >1.5L of water per day\par --Consider ellura/theracran\par --COnsider methenamine if additional infections\par \par ? renal stone. May be parenchymal. \par --Renal US in 1y
none

## 2023-12-11 ENCOUNTER — NON-APPOINTMENT (OUTPATIENT)
Age: 63
End: 2023-12-11

## 2023-12-11 ENCOUNTER — APPOINTMENT (OUTPATIENT)
Dept: GYNECOLOGIC ONCOLOGY | Facility: CLINIC | Age: 63
End: 2023-12-11
Payer: COMMERCIAL

## 2023-12-11 VITALS
HEIGHT: 68 IN | WEIGHT: 164 LBS | BODY MASS INDEX: 24.86 KG/M2 | DIASTOLIC BLOOD PRESSURE: 75 MMHG | HEART RATE: 85 BPM | SYSTOLIC BLOOD PRESSURE: 120 MMHG

## 2023-12-11 DIAGNOSIS — C54.9 MALIGNANT NEOPLASM OF CORPUS UTERI, UNSPECIFIED: ICD-10-CM

## 2023-12-11 PROCEDURE — 99214 OFFICE O/P EST MOD 30 MIN: CPT

## 2024-02-07 ENCOUNTER — APPOINTMENT (OUTPATIENT)
Dept: UROLOGY | Facility: CLINIC | Age: 64
End: 2024-02-07

## 2024-02-09 ENCOUNTER — OUTPATIENT (OUTPATIENT)
Dept: OUTPATIENT SERVICES | Facility: HOSPITAL | Age: 64
LOS: 1 days | End: 2024-02-09
Payer: COMMERCIAL

## 2024-02-09 ENCOUNTER — APPOINTMENT (OUTPATIENT)
Dept: UROLOGY | Facility: CLINIC | Age: 64
End: 2024-02-09
Payer: COMMERCIAL

## 2024-02-09 DIAGNOSIS — R35.0 FREQUENCY OF MICTURITION: ICD-10-CM

## 2024-02-09 DIAGNOSIS — N20.0 CALCULUS OF KIDNEY: ICD-10-CM

## 2024-02-09 DIAGNOSIS — N39.0 URINARY TRACT INFECTION, SITE NOT SPECIFIED: ICD-10-CM

## 2024-02-09 PROCEDURE — 99213 OFFICE O/P EST LOW 20 MIN: CPT

## 2024-02-09 PROCEDURE — 76775 US EXAM ABDO BACK WALL LIM: CPT

## 2024-02-09 PROCEDURE — 76775 US EXAM ABDO BACK WALL LIM: CPT | Mod: 26

## 2024-02-09 NOTE — PHYSICAL EXAM
[General Appearance - Well Developed] : well developed [General Appearance - Well Nourished] : well nourished [General Appearance - In No Acute Distress] : no acute distress [Edema] : no peripheral edema [Exaggerated Use Of Accessory Muscles For Inspiration] : no accessory muscle use [Abdomen Soft] : soft [Abdomen Tenderness] : non-tender [Costovertebral Angle Tenderness] : no ~M costovertebral angle tenderness [FreeTextEntry1] : PVR=0 [Normal Station and Gait] : the gait and station were normal for the patient's age [No Focal Deficits] : no focal deficits [Oriented To Time, Place, And Person] : oriented to person, place, and time

## 2024-02-13 PROBLEM — N20.0 KIDNEY STONES: Status: ACTIVE | Noted: 2023-02-06

## 2024-02-13 PROBLEM — N39.0 URINARY TRACT INFECTION: Status: ACTIVE | Noted: 2021-10-15

## 2024-02-13 NOTE — HISTORY OF PRESENT ILLNESS
[FreeTextEntry1] : 64yo female with recurrent UTI and kidney stone.   Pt with hx of uterine ca s/p BRY with adjuvant radiation 2013. She has had issues with UTIs since then getting 2+ per year. She developed acute change in urinary sx last when she was seen by me last year (2023). She called PCP and was asked to give urine. She had increased frequency and urgency. No fever or chills. Also with L flank pain. She went to ER following day. WBC normal. UA dirty. Had CT scan that showed L ureteritis. Has B large cysts. Also ? of very small R renal stone. Has not had any infections since July. Not on any meds.   At baseline, no urinary complaints. Does not drink enough per report--drinks 2-3 bottles per day. No caffeine. Has issues with constipation. No prior hx of stones. Dad with hx of stones--multiple with surgery in the past.   Underwent US today that shows small stone. Renal cysts present.

## 2024-02-14 DIAGNOSIS — N39.0 URINARY TRACT INFECTION, SITE NOT SPECIFIED: ICD-10-CM

## 2024-02-14 DIAGNOSIS — N20.0 CALCULUS OF KIDNEY: ICD-10-CM

## 2024-02-28 LAB
APPEARANCE: CLEAR
BACTERIA UR CULT: NORMAL
BACTERIA: NEGATIVE /HPF
BILIRUBIN URINE: NEGATIVE
BLOOD URINE: NEGATIVE
CAST: 0 /LPF
COLOR: YELLOW
EPITHELIAL CELLS: 0 /HPF
GLUCOSE QUALITATIVE U: NEGATIVE MG/DL
KETONES URINE: NEGATIVE MG/DL
LEUKOCYTE ESTERASE URINE: NEGATIVE
MICROSCOPIC-UA: NORMAL
NITRITE URINE: NEGATIVE
PH URINE: 6.5
PROTEIN URINE: NEGATIVE MG/DL
RED BLOOD CELLS URINE: 1 /HPF
SPECIFIC GRAVITY URINE: 1.01
UROBILINOGEN URINE: 0.2 MG/DL
WHITE BLOOD CELLS URINE: 0 /HPF

## 2024-08-21 NOTE — ED ADULT NURSE NOTE - AS TEMP SITE
discharge was appreciated.  Septum was midline,   mucosa was without erythema, exudates or cobblestoning.  No thrush was noted.      Neck: Supple without thyromegaly. No elevated JVP. Trachea was midline. No carotid bruits were auscultated.    Respiratory: rhonchi scattered    Cardiovascular: Regular without murmur, clicks, gallops or rubs.  There is no left or right ventricular heave.    Pulses:  Carotid, radial and femoral pulses were equally bilaterally.    Abdomen: Slightly rounded and soft without organomegaly. No rebound, rigidity or guarding was appreciated.    Lymphatic: No lymphadenopathy.  Musculoskeletal: no joint deformity.    Extremities: no edema  Skin:  Warm and dry.  Good color, turgor and pigmentation. No lesions or scars.  Neurological/Psychiatric: The patient's general behavior, level of consciousness, thought content and emotional status is normal.  Cranial nerves II-XII are intact.      DATA:     2016 IMPRESSION:  No evidence of pulmonary embolism or acute pulmonary abnormality.         IMPRESSION:    1-Asthma COPD overlap syndrome   2-SOB     PLAN:      SOB nd PFT seems asthma copd overlaps   -Patient with PFT showing severe copd FEV 47 % with BD   Will proceed with following work up  Will need CT scan r/o nodule/cancer when eligible ,she has CT 2016  Will get 6 m walk test  /Breztri/    has been doing great with   Eosinophilic count 200  Resp allergy profile some allergy IGE 19  Alpha anti trypsin N  Pulmonary rehab  seems cannot commit to it ,advise to do when possible   If remian severe copd ,consider other intervention ,like tramsplant ets  -    Flu and Pneumovax     Thank you for allowing me to participate in [facility]. I will keep following with you ,should you have any concerns ,please contact us at [place] pulmonary office     Sincerely,        Blanca Jamison MD  Pulmonary & Critical Care Medicine     NOTE: This report was transcribed using voice recognition software. Every  oral

## 2024-09-23 NOTE — ED ADULT NURSE NOTE - BP NONINVASIVE DIASTOLIC (MM HG)
Call placed to patient and 12-31-24 appointment changed, provider unavailable that day. Verbalized understanding and thank you.    83

## 2024-12-09 ENCOUNTER — APPOINTMENT (OUTPATIENT)
Dept: GYNECOLOGIC ONCOLOGY | Facility: CLINIC | Age: 64
End: 2024-12-09

## 2024-12-09 DIAGNOSIS — C54.9 MALIGNANT NEOPLASM OF CORPUS UTERI, UNSPECIFIED: ICD-10-CM

## 2025-04-04 ENCOUNTER — APPOINTMENT (OUTPATIENT)
Dept: GYNECOLOGIC ONCOLOGY | Facility: CLINIC | Age: 65
End: 2025-04-04

## 2025-04-04 DIAGNOSIS — C54.9 MALIGNANT NEOPLASM OF CORPUS UTERI, UNSPECIFIED: ICD-10-CM
